# Patient Record
Sex: FEMALE | Race: WHITE | NOT HISPANIC OR LATINO | Employment: FULL TIME
[De-identification: names, ages, dates, MRNs, and addresses within clinical notes are randomized per-mention and may not be internally consistent; named-entity substitution may affect disease eponyms.]

---

## 2017-07-08 ENCOUNTER — HEALTH MAINTENANCE LETTER (OUTPATIENT)
Age: 42
End: 2017-07-08

## 2018-01-15 ENCOUNTER — COMMUNICATION - HEALTHEAST (OUTPATIENT)
Dept: FAMILY MEDICINE | Facility: CLINIC | Age: 43
End: 2018-01-15

## 2018-01-15 ENCOUNTER — OFFICE VISIT - HEALTHEAST (OUTPATIENT)
Dept: FAMILY MEDICINE | Facility: CLINIC | Age: 43
End: 2018-01-15

## 2018-01-15 DIAGNOSIS — F41.9 ANXIETY AND DEPRESSION: ICD-10-CM

## 2018-01-15 DIAGNOSIS — Z76.89 ENCOUNTER TO ESTABLISH CARE: ICD-10-CM

## 2018-01-15 DIAGNOSIS — F32.A ANXIETY AND DEPRESSION: ICD-10-CM

## 2018-01-15 ASSESSMENT — MIFFLIN-ST. JEOR: SCORE: 1403.39

## 2018-01-18 ENCOUNTER — TELEPHONE (OUTPATIENT)
Dept: FAMILY MEDICINE | Facility: CLINIC | Age: 43
End: 2018-01-18

## 2018-05-18 ENCOUNTER — OFFICE VISIT - HEALTHEAST (OUTPATIENT)
Dept: FAMILY MEDICINE | Facility: CLINIC | Age: 43
End: 2018-05-18

## 2018-05-18 DIAGNOSIS — Z00.00 ROUTINE HEALTH MAINTENANCE: ICD-10-CM

## 2018-05-18 DIAGNOSIS — Z13.220 SCREENING FOR LIPID DISORDERS: ICD-10-CM

## 2018-05-18 DIAGNOSIS — Z12.4 SCREENING FOR MALIGNANT NEOPLASM OF CERVIX: ICD-10-CM

## 2018-05-18 DIAGNOSIS — Z13.1 SCREENING FOR DIABETES MELLITUS: ICD-10-CM

## 2018-05-18 DIAGNOSIS — N93.9 ABNORMAL VAGINAL BLEEDING: ICD-10-CM

## 2018-05-18 DIAGNOSIS — F32.A ANXIETY AND DEPRESSION: ICD-10-CM

## 2018-05-18 DIAGNOSIS — M54.50 LEFT LOW BACK PAIN: ICD-10-CM

## 2018-05-18 DIAGNOSIS — F41.9 ANXIETY AND DEPRESSION: ICD-10-CM

## 2018-05-18 DIAGNOSIS — R31.9 HEMATURIA: ICD-10-CM

## 2018-05-18 DIAGNOSIS — N88.9 CERVIX ABNORMALITY: ICD-10-CM

## 2018-05-18 LAB
ALBUMIN UR-MCNC: ABNORMAL MG/DL
ANION GAP SERPL CALCULATED.3IONS-SCNC: 9 MMOL/L (ref 5–18)
APPEARANCE UR: CLEAR
BILIRUB UR QL STRIP: NEGATIVE
BUN SERPL-MCNC: 11 MG/DL (ref 8–22)
CALCIUM SERPL-MCNC: 10.1 MG/DL (ref 8.5–10.5)
CHLORIDE BLD-SCNC: 110 MMOL/L (ref 98–107)
CHOLEST SERPL-MCNC: 152 MG/DL
CO2 SERPL-SCNC: 18 MMOL/L (ref 22–31)
COLOR UR AUTO: YELLOW
CREAT SERPL-MCNC: 0.69 MG/DL (ref 0.6–1.1)
FASTING STATUS PATIENT QL REPORTED: YES
GFR SERPL CREATININE-BSD FRML MDRD: >60 ML/MIN/1.73M2
GLUCOSE BLD-MCNC: 86 MG/DL (ref 70–125)
GLUCOSE UR STRIP-MCNC: NEGATIVE MG/DL
HDLC SERPL-MCNC: 36 MG/DL
HGB UR QL STRIP: ABNORMAL
KETONES UR STRIP-MCNC: NEGATIVE MG/DL
LDLC SERPL CALC-MCNC: 96 MG/DL
LEUKOCYTE ESTERASE UR QL STRIP: ABNORMAL
NITRATE UR QL: NEGATIVE
PH UR STRIP: 6 [PH] (ref 5–8)
POTASSIUM BLD-SCNC: 4.4 MMOL/L (ref 3.5–5)
SODIUM SERPL-SCNC: 137 MMOL/L (ref 136–145)
SP GR UR STRIP: >=1.03 (ref 1–1.03)
TRIGL SERPL-MCNC: 102 MG/DL
UROBILINOGEN UR STRIP-ACNC: ABNORMAL

## 2018-05-18 ASSESSMENT — MIFFLIN-ST. JEOR: SCORE: 1393.75

## 2018-05-19 LAB — BACTERIA SPEC CULT: NO GROWTH

## 2018-05-21 ENCOUNTER — COMMUNICATION - HEALTHEAST (OUTPATIENT)
Dept: FAMILY MEDICINE | Facility: CLINIC | Age: 43
End: 2018-05-21

## 2018-05-21 LAB
HPV SOURCE: NORMAL
HUMAN PAPILLOMA VIRUS 16 DNA: NEGATIVE
HUMAN PAPILLOMA VIRUS 18 DNA: NEGATIVE
HUMAN PAPILLOMA VIRUS FINAL DIAGNOSIS: NORMAL
HUMAN PAPILLOMA VIRUS OTHER HR: NEGATIVE
SPECIMEN DESCRIPTION: NORMAL

## 2018-05-25 LAB
BKR LAB AP ABNORMAL BLEEDING: YES
BKR LAB AP BIRTH CONTROL/HORMONES: NORMAL
BKR LAB AP CERVICAL APPEARANCE: NORMAL
BKR LAB AP GYN ADEQUACY: NORMAL
BKR LAB AP GYN INTERPRETATION: NORMAL
BKR LAB AP HPV REFLEX: NORMAL
BKR LAB AP LMP: NORMAL
BKR LAB AP PATIENT STATUS: NORMAL
BKR LAB AP PREVIOUS ABNORMAL: NORMAL
BKR LAB AP PREVIOUS NORMAL: 2016
HIGH RISK?: NO
PATH REPORT.COMMENTS IMP SPEC: NORMAL
RESULT FLAG (HE HISTORICAL CONVERSION): NORMAL

## 2018-05-31 ENCOUNTER — RECORDS - HEALTHEAST (OUTPATIENT)
Dept: ADMINISTRATIVE | Facility: OTHER | Age: 43
End: 2018-05-31

## 2018-06-08 ENCOUNTER — COMMUNICATION - HEALTHEAST (OUTPATIENT)
Dept: FAMILY MEDICINE | Facility: CLINIC | Age: 43
End: 2018-06-08

## 2018-06-22 ENCOUNTER — OFFICE VISIT - HEALTHEAST (OUTPATIENT)
Dept: FAMILY MEDICINE | Facility: CLINIC | Age: 43
End: 2018-06-22

## 2018-06-22 DIAGNOSIS — D50.0 IRON DEFICIENCY ANEMIA DUE TO CHRONIC BLOOD LOSS: ICD-10-CM

## 2018-06-22 DIAGNOSIS — D25.9 UTERINE FIBROID: ICD-10-CM

## 2018-06-22 DIAGNOSIS — Z01.818 ENCOUNTER FOR PREOPERATIVE EXAMINATION FOR GENERAL SURGICAL PROCEDURE: ICD-10-CM

## 2018-06-22 LAB
ERYTHROCYTE [DISTWIDTH] IN BLOOD BY AUTOMATED COUNT: 17.6 % (ref 11–14.5)
HCG UR QL: NEGATIVE
HCT VFR BLD AUTO: 26.6 % (ref 35–47)
HGB BLD-MCNC: 7.9 G/DL (ref 12–16)
MCH RBC QN AUTO: 19.3 PG (ref 27–34)
MCHC RBC AUTO-ENTMCNC: 29.7 G/DL (ref 32–36)
MCV RBC AUTO: 65 FL (ref 80–100)
PLATELET # BLD AUTO: 387 THOU/UL (ref 140–440)
PMV BLD AUTO: 8.2 FL (ref 7–10)
RBC # BLD AUTO: 4.09 MILL/UL (ref 3.8–5.4)
SP GR UR STRIP: 1.03 (ref 1–1.03)
WBC: 8.2 THOU/UL (ref 4–11)

## 2018-06-22 ASSESSMENT — MIFFLIN-ST. JEOR: SCORE: 1388.31

## 2018-06-28 ENCOUNTER — ANESTHESIA - HEALTHEAST (OUTPATIENT)
Dept: SURGERY | Facility: HOSPITAL | Age: 43
End: 2018-06-28

## 2018-06-28 ASSESSMENT — MIFFLIN-ST. JEOR: SCORE: 1386.94

## 2018-06-29 ENCOUNTER — SURGERY - HEALTHEAST (OUTPATIENT)
Dept: SURGERY | Facility: HOSPITAL | Age: 43
End: 2018-06-29

## 2018-08-30 ENCOUNTER — COMMUNICATION - HEALTHEAST (OUTPATIENT)
Dept: FAMILY MEDICINE | Facility: CLINIC | Age: 43
End: 2018-08-30

## 2018-12-11 ENCOUNTER — OFFICE VISIT - HEALTHEAST (OUTPATIENT)
Dept: FAMILY MEDICINE | Facility: CLINIC | Age: 43
End: 2018-12-11

## 2018-12-11 DIAGNOSIS — J22 LOWER RESPIRATORY TRACT INFECTION: ICD-10-CM

## 2019-06-03 ENCOUNTER — COMMUNICATION - HEALTHEAST (OUTPATIENT)
Dept: SCHEDULING | Facility: CLINIC | Age: 44
End: 2019-06-03

## 2019-08-08 ENCOUNTER — COMMUNICATION - HEALTHEAST (OUTPATIENT)
Dept: SCHEDULING | Facility: CLINIC | Age: 44
End: 2019-08-08

## 2019-08-14 ENCOUNTER — COMMUNICATION - HEALTHEAST (OUTPATIENT)
Dept: FAMILY MEDICINE | Facility: CLINIC | Age: 44
End: 2019-08-14

## 2019-08-15 ENCOUNTER — OFFICE VISIT - HEALTHEAST (OUTPATIENT)
Dept: FAMILY MEDICINE | Facility: CLINIC | Age: 44
End: 2019-08-15

## 2019-08-15 DIAGNOSIS — F41.9 ANXIETY AND DEPRESSION: ICD-10-CM

## 2019-08-15 DIAGNOSIS — M54.2 NECK PAIN ON LEFT SIDE: ICD-10-CM

## 2019-08-15 DIAGNOSIS — H81.10 BENIGN PAROXYSMAL POSITIONAL VERTIGO, UNSPECIFIED LATERALITY: ICD-10-CM

## 2019-08-15 DIAGNOSIS — F32.A ANXIETY AND DEPRESSION: ICD-10-CM

## 2020-04-30 ENCOUNTER — COMMUNICATION - HEALTHEAST (OUTPATIENT)
Dept: FAMILY MEDICINE | Facility: CLINIC | Age: 45
End: 2020-04-30

## 2020-04-30 DIAGNOSIS — F32.A ANXIETY AND DEPRESSION: ICD-10-CM

## 2020-04-30 DIAGNOSIS — F41.9 ANXIETY AND DEPRESSION: ICD-10-CM

## 2020-05-21 ENCOUNTER — OFFICE VISIT - HEALTHEAST (OUTPATIENT)
Dept: FAMILY MEDICINE | Facility: CLINIC | Age: 45
End: 2020-05-21

## 2020-05-21 ENCOUNTER — COMMUNICATION - HEALTHEAST (OUTPATIENT)
Dept: SCHEDULING | Facility: CLINIC | Age: 45
End: 2020-05-21

## 2020-05-21 DIAGNOSIS — K21.00 GASTROESOPHAGEAL REFLUX DISEASE WITH ESOPHAGITIS: ICD-10-CM

## 2020-07-24 ENCOUNTER — COMMUNICATION - HEALTHEAST (OUTPATIENT)
Dept: FAMILY MEDICINE | Facility: CLINIC | Age: 45
End: 2020-07-24

## 2020-07-24 DIAGNOSIS — F32.A ANXIETY AND DEPRESSION: ICD-10-CM

## 2020-07-24 DIAGNOSIS — F41.9 ANXIETY AND DEPRESSION: ICD-10-CM

## 2020-08-17 ENCOUNTER — COMMUNICATION - HEALTHEAST (OUTPATIENT)
Dept: FAMILY MEDICINE | Facility: CLINIC | Age: 45
End: 2020-08-17

## 2020-08-17 DIAGNOSIS — K21.00 GASTROESOPHAGEAL REFLUX DISEASE WITH ESOPHAGITIS: ICD-10-CM

## 2020-10-21 ENCOUNTER — OFFICE VISIT - HEALTHEAST (OUTPATIENT)
Dept: FAMILY MEDICINE | Facility: CLINIC | Age: 45
End: 2020-10-21

## 2020-10-21 DIAGNOSIS — R10.84 ABDOMINAL PAIN, GENERALIZED: ICD-10-CM

## 2020-10-21 ASSESSMENT — MIFFLIN-ST. JEOR: SCORE: 1434.86

## 2020-11-02 ENCOUNTER — HOSPITAL ENCOUNTER (OUTPATIENT)
Dept: CT IMAGING | Facility: CLINIC | Age: 45
Discharge: HOME | End: 2020-11-02

## 2020-11-02 DIAGNOSIS — R10.84 ABDOMINAL PAIN, GENERALIZED: ICD-10-CM

## 2020-12-27 ENCOUNTER — COMMUNICATION - HEALTHEAST (OUTPATIENT)
Dept: SCHEDULING | Facility: CLINIC | Age: 45
End: 2020-12-27

## 2021-05-29 NOTE — TELEPHONE ENCOUNTER
"  Call from pt       \"Pretty sure I have a kidney stone\"     > Just started in the last few hrs - pain is a 7 currently     > Initially thought may have been UTI while at work but too painful and came home   > Overall feels similar to previous episode    > R sided lower back pain that radiates to groin area     No vomiting yet   No blood in urine but little output so far      A/P:   > To ED for eval now        Noel Smyth, RN   Triage and Medication Refills      Reason for Disposition    Pain radiates into groin, scrotum    Protocols used: FLANK PAIN-A-OH      "

## 2021-05-31 VITALS — WEIGHT: 184.5 LBS | HEIGHT: 60 IN | BODY MASS INDEX: 36.22 KG/M2

## 2021-05-31 NOTE — TELEPHONE ENCOUNTER
"Triage call:   Patient calling to report intermittent episodes of dizziness that have been going on for a couple months.  Had one bad episode (first episode) woke up and could not get out of bed due to dizziness- fell and couldn't walk straight- fell in the kitchen. Shook off and thought it was maybe vertigo.     Reports that she noted that the dizziness hasn't quite gone away when lays on her left side she feels very dizzy and feels like she could throw up. When she has to hold her neck back she gets very dizzy.   Currently she \"feels ok\" is able to work and drive- denies symptoms currently. But she has intermittent episodes. No HR changes. Patient reports that she just got insurance back and was finally able to schedule otherwise would have come in sooner. States that she has not been diagnosed with vertigo.      Triaged to be seen within the next 2 weeks. Reviewed additional care advice with patient and she verbalizes understanding. Patient warm transferred to scheduling for appointment. Appointment scheduled with Michelle @ 10:40 am 8/15/19.    Jane Balderas RN [facility] Connection Triage/Med Refill 8/8/2019 11:38 AM     Reason for Disposition    Dizziness not present now, but is a chronic symptom (recurrent or ongoing AND lasting > 4 weeks)    Protocols used: DIZZINESS-A-OH      "

## 2021-05-31 NOTE — PROGRESS NOTES
Assessment/Plan:     1. Benign paroxysmal positional vertigo, unspecified laterality  Appears to be having linger symptoms from an acute episode of BPPV.  Refer to OT for vestibular therapy.  Recommend good hydration and slow positional changes.  Declines Meclizine at this time.  - Ambulatory referral to Adult PT- Internal  - Ambulatory referral to Adult OT- Internal    2. Anxiety and depression  Restart Lexapro for anxiety and depression.  Discussed proper use and possible side effects of medication.  Follow up in 4 weeks.   - escitalopram oxalate (LEXAPRO) 10 MG tablet; Take 1 tablet (10 mg total) by mouth daily.  Dispense: 60 tablet; Refill: 0    3. Neck pain on left side  Suspect muscular strain.  Could be contributing to vertigo symptoms.  Recommend active rest, gentle stretching, ice/heat, and Ibuprofen as needed.  Refer to PT.   - Ambulatory referral to Adult PT- Internal        Subjective:     Sirisha Palmer is a 43 y.o. female who presents with complaints of dizziness.  Symptoms started acutely a couple of months ago.  She woke up feeling severely dizzy.  States the room was spinning around her.  In order to get out of bed, she had to crawl on the floor.  She did fall at one point, but she was already close to the floor.  She denies any headache, nausea, or vomiting with this episode.  The severity eventually subsided.  She did not have insurance, which is why she did not come in.  Patient still has intermittent dizziness.  This is worse when she moves her head quickly or lays on her side.  She is having some left sided neck pain and stiffness.  She has had some mild headaches and mild vision blurriness.  She is sleeping well at night.  No nausea, vomiting, or abdominal pain.  She is taking magnesium at night for sleep.  Denies any fever, sinus congestion, runny nose, or sore throat.    Patient has been out of insurance for the past year.  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  She was  put on Lexapro last year for anxiety and depression, but did not continue this secondary to loss of insurance.  Her mood is feeling more stable, but she does feel like she would benefit a medication again.  Denies any panic attacks or suicidal ideations.  She stopped Lexapro about 1 year ago.      The following portions of the patient's history were reviewed and updated as appropriate: allergies, current medications.    Review of Systems  A comprehensive review of systems was performed and was otherwise negative    Objective:     /72   Pulse 74   Temp 98.7  F (37.1  C)   Wt 176 lb 12.8 oz (80.2 kg)   LMP 06/21/2018   SpO2 97%   BMI 34.53 kg/m      General Appearance: Alert, cooperative, no distress, appears stated age  Head: Normocephalic, without obvious abnormality, atraumatic  Eyes: PERRL, conjunctiva/corneas clear, EOM's intact. No nystagmus.   Ears: Normal TM's and external ear canals, both ears  Throat: Lips, mucosa, and tongue normal; teeth and gums normal  Neck: Supple, symmetrical, trachea midline, no adenopathy;  thyroid: not enlarged, symmetric, no tenderness/mass/nodules  Lungs: Clear to auscultation bilaterally, respirations unlabored  Heart: Regular rate and rhythm, S1 and S2 normal, no murmur, rub, or gallop   Abdomen: Soft, non-tender, bowel sounds active all four quadrants,  no masses, no organomegaly  Neurologic: A&Ox4. Gait normal. No focal deficits. Upper and lower extremity strength equal bilaterally.   Psychologic: appropriate affective, answers all of my questions appropriately. No hallucinations, delusion, or suicidal ideations.    PHQ-9 Total Score: 15 (8/15/2019 11:00 AM)    EDILMA 7 Total Score: 19 (8/15/2019 11:00 AM)        Michelle Santana NP

## 2021-06-01 VITALS — WEIGHT: 181.5 LBS | BODY MASS INDEX: 35.63 KG/M2 | HEIGHT: 60 IN

## 2021-06-01 VITALS — BODY MASS INDEX: 35.34 KG/M2 | HEIGHT: 60 IN | WEIGHT: 180 LBS

## 2021-06-01 VITALS — WEIGHT: 180.3 LBS | BODY MASS INDEX: 35.4 KG/M2 | HEIGHT: 60 IN

## 2021-06-02 VITALS — BODY MASS INDEX: 35.35 KG/M2 | WEIGHT: 182.5 LBS

## 2021-06-03 VITALS — BODY MASS INDEX: 34.53 KG/M2 | WEIGHT: 176.8 LBS

## 2021-06-04 VITALS
WEIGHT: 190.56 LBS | BODY MASS INDEX: 37.22 KG/M2 | OXYGEN SATURATION: 99 % | HEART RATE: 70 BPM | DIASTOLIC BLOOD PRESSURE: 84 MMHG | SYSTOLIC BLOOD PRESSURE: 122 MMHG

## 2021-06-05 VITALS
OXYGEN SATURATION: 96 % | DIASTOLIC BLOOD PRESSURE: 88 MMHG | HEART RATE: 72 BPM | WEIGHT: 190.56 LBS | BODY MASS INDEX: 37.41 KG/M2 | SYSTOLIC BLOOD PRESSURE: 139 MMHG | HEIGHT: 60 IN

## 2021-06-07 NOTE — TELEPHONE ENCOUNTER
Refill Approved    Rx renewed per Medication Renewal Policy. Medication was last renewed on 8/15/19.    Monica Guzman, [facility] Triage/Med Refill 4/30/2020     Requested Prescriptions   Pending Prescriptions Disp Refills     escitalopram oxalate (LEXAPRO) 10 MG tablet 60 tablet 0     Sig: Take 1 tablet (10 mg total) by mouth daily.       SSRI Refill Protocol  Passed - 4/30/2020 10:17 AM        Passed - PCP or prescribing provider visit in last year     Last office visit with prescriber/PCP: 8/15/2019 Michelle Santana NP OR same dept: 8/15/2019 Michelle Santana NP OR same specialty: 8/15/2019 Michelle Santana NP  Last physical: 6/22/2018 Last MTM visit: Visit date not found   Next visit within 3 mo: Visit date not found  Next physical within 3 mo: Visit date not found  Prescriber OR PCP: Michelle Santana NP  Last diagnosis associated with med order: 1. Anxiety and depression  - escitalopram oxalate (LEXAPRO) 10 MG tablet; Take 1 tablet (10 mg total) by mouth daily.  Dispense: 60 tablet; Refill: 0    If protocol passes may refill for 12 months if within 3 months of last provider visit (or a total of 15 months).

## 2021-06-08 NOTE — PROGRESS NOTES
ASSESSMENT/PLAN:  Sirisha was seen today for cough.    Diagnoses and all orders for this visit:    Gastroesophageal reflux disease with esophagitis  -     omeprazole (PRILOSEC) 20 MG capsule; Take 1 capsule (20 mg total) by mouth daily before breakfast.        There are no Patient Instructions on file for this visit.    SUBJECTIVE:    Sirisha Palmer is a 44 y.o. female who came in today     1 month cough after eating, regardless of the type of food  Not to liquid or saliva  Cough after eating solid  No known allergies or asthma  2 wks ago, coughing so hard & gagging  Chronic burping but more excessive now  H/o heartburn  Ice cream, dairy, and gluten tends to exacerbate symptoms  Dysphagia (occasion) with solid but not to liquid  No odynphagia  No N/V, abdominal pain but with discomfort after eating large meals   Weight gain since COVID19  H/o ulcer, multiple times  S/p cholecystecomy  Exertional dyspnea more than usual  Lately (house cleaning job)  No chest pain  Bowel movements more often   Not a smoker  Takes lexapro for anxiety and prn aleve for HA    Review of Systems (except those mentioned above)  Constitutional: Negative.   HENT: Negative.   Eyes: Negative.   Respiratory: Negative.   Cardiovascular: Negative.   Gastrointestinal: Negative.   Endocrine: Negative.   Genitourinary: Negative.   Musculoskeletal: Negative.   Skin: Negative.   Allergic/Immunologic: Negative.   Neurological: Negative.   Hematological: Negative.   Psychiatric/Behavioral: Negative.     Patient Active Problem List    Diagnosis Date Noted     Mixed emotional features as adjustment reaction 06/28/2016     Calculus of ureter 11/12/2015     Urinary calculus, unspecified 11/12/2015     Allergies   Allergen Reactions     Codeine Nausea And Vomiting     Can tolerate if takes Zofran beforehand     Current Outpatient Medications   Medication Sig Dispense Refill     escitalopram oxalate (LEXAPRO) 10 MG tablet Take 1 tablet (10 mg total) by  mouth daily. 90 tablet 0     naproxen sodium (ALEVE) 220 MG tablet Take 220 mg by mouth 2 (two) times a day with meals. As needed for cramps       albuterol (PROAIR HFA;PROVENTIL HFA;VENTOLIN HFA) 90 mcg/actuation inhaler Inhale 2 puffs every 6 (six) hours as needed for wheezing. 1 each 0     omeprazole (PRILOSEC) 20 MG capsule Take 1 capsule (20 mg total) by mouth daily before breakfast. 90 capsule 0     No current facility-administered medications for this visit.      Past Medical History:   Diagnosis Date     Anemia      Kidney stones      Kidney stones      Menorrhagia      Past Surgical History:   Procedure Laterality Date     CHOLECYSTECTOMY       LAPAROSCOPIC TOTAL HYSTERECTOMY N/A 6/29/2018    Procedure: ROBOTIC TOTAL LAPAROSCOPIC HYSTERECTOMY BILATERAL SALPINGECTOMY CYSTOSCOPY;  Surgeon: Sandy Gabriel MD;  Location: [facility];  Service:      FL ERCP W/BIOPSY SINGLE/MULTIPLE       wisdom teeth extractions       Social History     Socioeconomic History     Marital status:      Spouse name: None     Number of children: None     Years of education: None     Highest education level: None   Occupational History     Occupation: Marketing     Employer: [employer]   Social Needs     Financial resource strain: None     Food insecurity     Worry: None     Inability: None     Transportation needs     Medical: None     Non-medical: None   Tobacco Use     Smoking status: Never Smoker     Smokeless tobacco: Never Used   Substance and Sexual Activity     Alcohol use: Yes     Comment: occasional glass of wine     Drug use: No     Sexual activity: Yes     Partners: Male     Birth control/protection: None   Lifestyle     Physical activity     Days per week: None     Minutes per session: None     Stress: None   Relationships     Social connections     Talks on phone: None     Gets together: None     Attends Rastafari service: None     Active member of club or organization: None     Attends  meetings of clubs or organizations: None     Relationship status: None     Intimate partner violence     Fear of current or ex partner: None     Emotionally abused: None     Physically abused: None     Forced sexual activity: None   Other Topics Concern     None   Social History Narrative     None     Family History   Problem Relation Age of Onset     Urolithiasis Mother      Heart disease Father      Stroke Father      Urolithiasis Sister      Cancer Maternal Aunt      Cancer Paternal Uncle      Diabetes Maternal Grandmother      Cancer Maternal Grandmother      Cancer Maternal Grandfather      Cancer Paternal Grandfather      Clotting disorder Neg Hx      Gout Neg Hx          OBJECTIVE:    Vitals:    05/21/20 1339   BP: 122/84   Patient Site: Right Arm   Patient Position: Sitting   Cuff Size: Adult Regular   Pulse: 70   SpO2: 99%   Weight: 190 lb 9 oz (86.4 kg)     Body mass index is 37.22 kg/m .    Physical Exam:  Constitutional: Patient was oriented to person, place, and time. Patient appeared well-developed and well-nourished. No distress.   Head: Normocephalic and atraumatic.   Right Ear: External ear normal.   Left Ear: External ear normal.   Nose: Nose normal.   Mouth/Throat: Oropharynx was clear and moist. No oropharyngeal exudate.   Eyes: Conjunctivae and EOM were normal. Pupils were equal, round, and reactive to light. Right eye exhibited no discharge. Left eye exhibited no discharge. No scleral icterus.   Neck: Neck supple. No JVD present. No tracheal deviation present. No thyromegaly present.   Lymphadenopathy:  Patient has no cervical adenopathy.   Cardiovascular: Normal rate, regular rhythm, normal heart sounds and intact distal pulses. No murmur heard.   Pulmonary/Chest: Effort normal and breath sounds normal. No stridor. No respiratory distress. Patient had no wheezes, no rales, exhibits no tenderness.   Abdominal: Soft. Bowel sounds were normal. Patient exhibited no distension and no mass. There  was no tenderness. There was no rebound and no guarding.   Skin: Skin was warm and dry. No rash noted. Patient was not diaphoretic. No erythema. No pallor.

## 2021-06-10 NOTE — TELEPHONE ENCOUNTER
Refill Approved    Rx renewed per Medication Renewal Policy. Medication was last renewed on 4/30/20.    Radha Stephenson, [facility] Triage/Med Refill 7/27/2020     Requested Prescriptions   Pending Prescriptions Disp Refills     escitalopram oxalate (LEXAPRO) 10 MG tablet [Pharmacy Med Name: ESCITALOPRAM 10MG TABLETS] 90 tablet 0     Sig: TAKE 1 TABLET(10 MG) BY MOUTH DAILY       SSRI Refill Protocol  Passed - 7/24/2020 12:34 PM        Passed - PCP or prescribing provider visit in last year     Last office visit with prescriber/PCP: 8/15/2019 Michelle Santana NP OR same dept: 5/21/2020 Josey Yoo MD OR same specialty: 5/21/2020 Josey Yoo MD  Last physical: 6/22/2018 Last MTM visit: Visit date not found   Next visit within 3 mo: Visit date not found  Next physical within 3 mo: Visit date not found  Prescriber OR PCP: Michelle Santana NP  Last diagnosis associated with med order: 1. Anxiety and depression  - escitalopram oxalate (LEXAPRO) 10 MG tablet [Pharmacy Med Name: ESCITALOPRAM 10MG TABLETS]; TAKE 1 TABLET(10 MG) BY MOUTH DAILY  Dispense: 90 tablet; Refill: 0    If protocol passes may refill for 12 months if within 3 months of last provider visit (or a total of 15 months).

## 2021-06-10 NOTE — TELEPHONE ENCOUNTER
Refill Approved    Rx renewed per Medication Renewal Policy. Medication was last renewed on 5/21/20.    Monica Guzman, [facility] Connection Triage/Med Refill 8/17/2020     Requested Prescriptions   Pending Prescriptions Disp Refills     omeprazole (PRILOSEC) 20 MG capsule [Pharmacy Med Name: OMEPRAZOLE 20MG CAPSULES] 90 capsule 0     Sig: TAKE 1 CAPSULE(20 MG) BY MOUTH DAILY BEFORE BREAKFAST       GI Medications Refill Protocol Passed - 8/17/2020  3:30 AM        Passed - PCP or prescribing provider visit in last 12 or next 3 months.     Last office visit with prescriber/PCP: 5/21/2020 Josey Yoo MD OR same dept: 5/21/2020 Josey Yoo MD OR same specialty: 5/21/2020 Josey Yoo MD  Last physical: Visit date not found Last MTM visit: Visit date not found   Next visit within 3 mo: Visit date not found  Next physical within 3 mo: Visit date not found  Prescriber OR PCP: Josey Fletcher MD  Last diagnosis associated with med order: 1. Gastroesophageal reflux disease with esophagitis  - omeprazole (PRILOSEC) 20 MG capsule [Pharmacy Med Name: OMEPRAZOLE 20MG CAPSULES]; TAKE 1 CAPSULE(20 MG) BY MOUTH DAILY BEFORE BREAKFAST  Dispense: 90 capsule; Refill: 0    If protocol passes may refill for 12 months if within 3 months of last provider visit (or a total of 15 months).

## 2021-06-12 NOTE — PROGRESS NOTES
"     Assessment:   1. Abdominal pain, generalized  Discussed possible diagnosis and treatment. Encouraged patient to take her prescribed PPI 7 days a week and not 3-4 days a week as she has been taking it. Also recommend ed a bland diet until she sees the gastroenterologist.   - Ambulatory referral to Gastroenterology  - CT Abdomen Without Oral With and Without IV Contrast; Future     Plan:   The diagnosis was discussed with the patient and evaluation and treatment plans outlined.  See orders for lab and imaging studies.  Reassured patient that symptoms are almost certainly benign and self-resolving.  Adhere to simple, bland diet.  Adhere to low fat diet.  Further follow-up plans will be based on outcome of lab/imaging studies; see orders.  Follow up as needed.     Subjective:   Sirisha Palmer is a 44 y.o. female who presents for evaluation of abdominal pain. Onset was 3 weeks ago. Symptoms have been gradually worsening. The pain is described as pinching pain that radiates around her stomach, and she has always had stomach issues but its getting worse. Pain is located in the epigastric region with radiation to the entire abdomen area.  Aggravating factors: food.  Alleviating factors: none. Associated symptoms: diarrhea. The patient denies anorexia, arthralagias, belching, chills, constipation, dysuria, fever, flatus and frequency.  The following portions of the patient's history were reviewed and updated as appropriate: allergies, current medications, past family history, past medical history, past social history, past surgical history and problem list.    Review of Systems  A 12 point comprehensive review of systems was negative except as noted.       Objective:   /88   Pulse 72   Ht 5' 0.25\" (1.53 m)   Wt 190 lb 9 oz (86.4 kg)   LMP 06/21/2018   SpO2 96%   BMI 36.91 kg/m    General appearance: alert, appears stated age and cooperative  Head: Normocephalic, without obvious abnormality, " atraumatic  Eyes: conjunctivae/corneas clear. PERRL, EOM's intact. Fundi benign.  Neck: no adenopathy, no carotid bruit, no JVD, supple, symmetrical, trachea midline and thyroid not enlarged, symmetric, no tenderness/mass/nodules  Heart: regular rate and rhythm, S1, S2 normal, no murmur, click, rub or gallop  Abdomen: abnormal findings:  hypoactive bowel sounds and tenderness with palpation of the epigastric area  Pulses: 2+ and symmetric  Skin: Skin color, texture, turgor normal. No rashes or lesions  Lymph nodes: Cervical, supraclavicular, and axillary nodes normal.  Neurologic: Grossly normal

## 2021-06-14 NOTE — TELEPHONE ENCOUNTER
"\"I think I have a Bartholin cyst, I have had it once before about a year ago. I was seen in the UC and they drained it. It began day before yesterday. I have been soaking in a hot tub and using hot compresses. It is significantly worse today. It has begun to drain on it's own: a little bit of blood and white milky like pus. Size; at first it was marble sized, but now larger. All around it is swollen, puffy and red.\" No fever noted. T=99.3. Pain currently =\"7\" if she is moving around or sitting. She states that it is hard to walk due to the pain. She had taken Tylenol 3 Extra Strength earlier today, but it did not help. (Cautioned regarding dose).   Triaged to a disposition of See HCP within 4 hrs. Discussed options: , UR or ER.  and UR [place] information given. She intends to go to [facility], as she had been there before with this problem.     Adelina Hernandez RN Triage Nurse Advisor 6:50 PM 12/27/2020    Additional Information    Negative: Sounds like a life-threatening emergency to the triager    Negative: Followed a genital area injury    Negative: Foreign body in vagina (e.g., tampon)    Negative: Vaginal bleeding is main symptom    Negative: Vaginal discharge is main symptom    Negative: Pain or burning with passing urine (urination) is main symptom    Negative: Menstrual cramps is main symptom    Negative: Abdomen pain is main symptom    Negative: Pubic lice suspected    Negative: Itching or rash of external female genital area (vulva)    Negative: Patient sounds very sick or weak to the triager    [1] SEVERE pain AND [2] not improved 2 hours after pain medicine    Protocols used: VAGINAL SYMPTOMS-A-AH    COVID 19 Nurse Triage Plan/Patient Instructions    Please be aware that novel coronavirus (COVID-19) may be circulating in the community. If you develop symptoms such as fever, cough, or SOB or if you have concerns about the presence of another infection including coronavirus (COVID-19), please " contact your health care provider or visit [email or web address].     Disposition/Instructions    In-Person Visit with provider recommended. Reference Visit Selection Guide.    Thank you for taking steps to prevent the spread of this virus.  o Limit your contact with others.  o Wear a simple mask to cover your cough.  o Wash your hands well and often.    Resources    [facility]: About COVID-19: [email or web address]    CDC: What to Do If You're Sick: www.cdc.gov/coronavirus/2019-ncov/about/steps-when-sick.html    CDC: Ending Home Isolation: www.cdc.gov/coronavirus/2019-ncov/hcp/disposition-in-home-patients.html     CDC: Caring for Someone: www.cdc.gov/coronavirus/2019-ncov/if-you-are-sick/care-for-someone.html     [facility]: Interim Guidance for Hospital Discharge to Home: [email or web address]/hcp/[email or web address]    [facility] clinical trials (COVID-19 research studies): [email or web address]-clinical-trials     Below are the COVID-19 hotlines at the [place] [facility] ([facility]). Interpreters are available.   o For health questions: Call 007-661-4104 or 1-100.539.9537 (7 a.m. to 7 p.m.)  o For questions about schools and childcare: Call 016-273-5438 or 1-234.173.3085 (7 a.m. to 7 p.m.)

## 2021-06-15 NOTE — PROGRESS NOTES
Assessment/Plan:     1. Encounter to establish care    2. Anxiety and depression  Significant anxiety and depression.  Recommend restarting patient on medication.  Start Lexapro, 10 mg once daily.  Referral placed for CBT, and I highly encouraged individual therapy.  Patient will contact myself, call 911, or pleural directly to the ER if she is having any suicidal ideations.  Discussed deep breathing in the times of anxiety.  Follow-up with myself in 3-4 weeks for a complete physical and medication follow-up.  EVELIN sent to previous clinic.  - Ambulatory referral to Psychology  - escitalopram oxalate (LEXAPRO) 10 MG tablet; Take 1 tablet (10 mg total) by mouth daily.  Dispense: 30 tablet; Refill: 1        Subjective:     Sirisha Palmer is a 42 y.o. female who presents to establish care.  She moved here approximately 3 years ago from [place].  The majority of her family and friends still reside there.  Patient moved to [place] to  her now .  This has not been an easy process for her, as she found out that her  has cheated on her several times including the day of their wedding.  Previously worked as a  for a pharmaceutical company, and did quite well for herself.  She lost her job due to increased stress, and now works as an  part-time.  Patient is still  but does not feel that her  is very supportive.  She is a 7-year-old stepson who is challenging.  He did go to couples therapy a couple of times.  Patient has a previous history of anxiety, depression, and PTSD.  She was on medications in the past, but prior to this last 3 years had been doing very well.  Patient has had increasingly more anxiety, anger, frustration, and sadness.  She does have suicidal thoughts, but no specific plan to follow through.  Patient is quite teary today and explains that she is having a hard time making decisions and remembering small things.  Her overall thoughts feel  fuzzy.  She has gained weight and at times scratches at her head obsessively.  Patient believes she was previously trialed on Celexa, Zoloft, and Lexapro.  She recalls one working better than the others, but does not quite remember.      The following portions of the patient's history were reviewed and updated as appropriate: allergies, current medications, past family history, past medical history, past social history, past surgical history and problem list.    Review of Systems  Pertinent items are noted in HPI.     Objective:     /82 (Patient Site: Right Arm, Patient Position: Sitting, Cuff Size: Adult Large)  Pulse 93  Temp 98.3  F (36.8  C) (Oral)   Resp 20  Ht 5' (1.524 m)  Wt 184 lb 8 oz (83.7 kg)  BMI 36.03 kg/m2    General Appearance: Alert, cooperative, appears stated age. Teary  Psychologic: appropriate affective, answers all of my questions appropriately. No hallucinations, delusion, or suicidal ideations.    PHQ-9 Total Score: 21 (1/15/2018  2:03 PM)  EDILMA-7 Total: 21 (1/15/2018  2:00 PM)      Time: total time spent with the patient was 30 minutes of which >50% was spent in counseling and coordination of care      MIKE Trejo

## 2021-06-18 NOTE — PROGRESS NOTES
Preoperative Exam    Scheduled Procedure: Total laparoscopic hysterectomy with bilateral salpingectomy  Surgery Date:  6/29/18  Surgery Location: [facility], fax [phone]    Surgeon:  Dr. Gabriel    Assessment/Plan:     1. Encounter for preoperative examination for general surgical procedure  - Pregnancy (Beta-hCG, Qual), Urine  - HM2(CBC w/o Differential)    2. Uterine fibroid    3. Iron deficiency anemia due to chronic blood loss  Hemoglobin is low at 7.9.  Likely secondary to heavy menstrual periods related to uterine fibroids.  Notified Dr. Gabriel, who agrees with oral iron supplementation prior to surgery.  Recommend recheck in 3 months.   - ferrous sulfate 325 (65 FE) MG tablet; Take 1 tablet (325 mg total) by mouth 3 (three) times a day with meals.  Dispense: 60 tablet; Refill: 3      Surgical Procedure Risk: Low (reported cardiac risk generally < 1%)  Have you had prior anesthesia?: yes  Have you or any family members had a previous anesthesia reaction:  no  Do you or any family members have a history of a clotting or bleeding disorder?:  No  Cardiac Risk Assessment: no increased risk for major cardiac complications    Patient approved for surgery with general or local anesthesia.    Please Note:  none    Functional Status: Independent  Patient plans to recover at home with family.     Subjective:      Sirisha Palmer is a 42 y.o. female who presents for a preoperative consultation.  Patient is scheduled for a robotic total laparoscopic hysterectomy with bilateral salpingectomy.  Patient has been having regular intermenstrual bleeding and postcoital bleeding.  He was referred to gynecology per myself, and found to have a cervical polyp and multiple uterine fibroids bulging into the uterine cavity.  Patient is currently bleeding.  She does have a moderate amount of abdominal cramping as well.  Patient is currently utilizing Aleve and Tylenol for her pain.  She is aware that she will need to  stop the Aleve starting today until after her surgery.  Patient is sexually active; not using any forms of contraception.    She is Lexapro for anxiety/depression.  Her mood is stable at this time.  She will be following up with me after surgery for this.     All other systems reviewed and are negative, other than those listed in the HPI.    Pertinent History  Do you have difficulty breathing or chest pain after walking up a flight of stairs: No  History of obstructive sleep apnea: No  Steroid use in the last 6 months: No  Frequent Aspirin/NSAID use: Yes: Aleve  Prior Blood Transfusion: No  Prior Blood Transfusion Reaction: No  Blood Transfusion Statement:  There is no transfusion refusal.    Current Outpatient Prescriptions   Medication Sig Dispense Refill     escitalopram oxalate (LEXAPRO) 10 MG tablet Take 1 tablet (10 mg total) by mouth daily. 30 tablet 1     naproxen sodium (ALEVE) 220 MG tablet Take 220 mg by mouth 2 (two) times a day with meals. As needed for cramps       No current facility-administered medications for this visit.         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[employer]     Social History Main Topics     Smoking status: Never Smoker     Smokeless tobacco: Never Used     Alcohol use Yes      Comment: occas     Drug use: No     Sexual activity: Yes     Partners: Male     Birth control/ protection: None     Other Topics Concern     Not on file  "    Social History Narrative       Family History   Problem Relation Age of Onset     Urolithiasis Mother      Heart disease Father      Stroke Father      Urolithiasis Sister      Cancer Maternal Aunt      Cancer Paternal Uncle      Diabetes Maternal Grandmother      Cancer Maternal Grandmother      Cancer Maternal Grandfather      Cancer Paternal Grandfather      Clotting disorder Neg Hx      Gout Neg Hx          Objective:     Vitals:    06/22/18 0904   BP: 110/64   Pulse: 78   Temp: 98.8  F (37.1  C)   TempSrc: Oral   Weight: 180 lb 4.8 oz (81.8 kg)   Height: 5' 0.25\" (1.53 m)         Physical Exam:  General Appearance: Alert, cooperative, no distress, appears stated age  Head: Normocephalic, without obvious abnormality, atraumatic  Eyes: PERRL, conjunctiva/corneas clear, EOM's intact  Ears: Normal TM's and external ear canals, both ears  Nose: Nares normal, septum midline,mucosa normal, no drainage  Throat: Lips, mucosa, and tongue normal; teeth and gums normal  Neck: Supple, symmetrical, trachea midline, no adenopathy;  thyroid: not enlarged, symmetric, no tenderness/mass/nodules; no carotid bruit or JVD  Back: Symmetric, no curvature, ROM normal, no CVA tenderness  Lungs: Clear to auscultation bilaterally, respirations unlabored  Heart: Regular rate and rhythm, S1 and S2 normal, no murmur, rub, or gallop   Abdomen: Soft, non-tender, bowel sounds active all four quadrants,  no masses, no organomegaly  Extremities: Extremities normal, atraumatic, no cyanosis or edema  Skin: Skin color, texture, turgor normal, no rashes or lesions  Lymph nodes: Cervical, supraclavicular, and axillary nodes normal  Neurologic: Normal     Patient Instructions     Hold all supplements, aspirin and NSAIDs for 7 days prior to surgery.  Follow your surgeon's direction on when to stop eating and drinking prior to surgery.  Your surgeon will be managing your pain after your surgery.    Remove all jewelry and metal piercings before your " surgery.   Remove nail polish from fingers before surgery.      Labs:  Recent Results (from the past 24 hour(s))   Pregnancy (Beta-hCG, Qual), Urine    Collection Time: 06/22/18  9:35 AM   Result Value Ref Range    Pregnancy Test, Urine Negative Negative    Specific Gravity, UA 1.030 1.001 - 1.030   HM2(CBC w/o Differential)    Collection Time: 06/22/18  9:35 AM   Result Value Ref Range    WBC 8.2 4.0 - 11.0 thou/uL    RBC 4.09 3.80 - 5.40 mill/uL    Hemoglobin 7.9 (LL) 12.0 - 16.0 g/dL    Hematocrit 26.6 (L) 35.0 - 47.0 %    MCV 65 (L) 80 - 100 fL    MCH 19.3 (L) 27.0 - 34.0 pg    MCHC 29.7 (L) 32.0 - 36.0 g/dL    RDW 17.6 (H) 11.0 - 14.5 %    Platelets 387 140 - 440 thou/uL    MPV 8.2 7.0 - 10.0 fL       Immunization History   Administered Date(s) Administered     Tdap 01/01/2012         Electronically signed by Michelle Santana NP

## 2021-06-18 NOTE — PROGRESS NOTES
Assessment/Plan:     1. Routine health maintenance    2. Hematuria  I do think what patient believes is blood in her urine, is actually vaginal bleeding.  Proceed with gynecology referral and workup due to abnormal cervical exam.  Urine culture pending.  - Urinalysis-UC if Indicated  - Culture, Urine    3. Screening for diabetes mellitus  - Basic Metabolic Panel    4. Screening for lipid disorders  - Lipid Leonard FASTING    5. Screening for malignant neoplasm of cervix  - Gynecologic Cytology (PAP Smear)    6. Anxiety and depression  PHQ 9 score of 19 and EDILMA 7 score of 21.  I do think patient would benefit from a medication, and she agrees.  Her biggest concern is finances.  I encouraged her to fill the Lexapro as previously prescribed, and she will let me know if this is too expensive for her.  I do think this will help manage her moods better and help her with concentration and anxiety.  - escitalopram oxalate (LEXAPRO) 10 MG tablet; Take 1 tablet (10 mg total) by mouth daily.  Dispense: 30 tablet; Refill: 1    7. Abnormal vaginal bleeding  - Ambulatory referral to Gynecology    8. Cervix abnormality  With abnormal vaginal bleeding, postcoital bleeding, and an abnormal appearing cervix.  Pap is pending.  I recommend follow-up with gynecology for better assessment, and possible biopsy.  Patient agrees with this.  - Ambulatory referral to Gynecology    9. Left low back pain  Recommend resuming Aleve twice daily with food.  Continue regular activity, and monitor ergo dynamics with lifting.  Discussed and demonstrated stretching that I would like her to do 1-2 times daily.  If not improving, consider physical therapy.    Subjective:   Sirisha Palmer  is a 42 y.o. female who presents for an annual exam.     Patient is due for a Pap.  She does not think she has ever had any abnormals.  She is having regular periods, but they have become heavier and more painful in the last several months.  She has been spotting  between periods.  Patient endorses that sexual intercourse is very painful, even with lubrication.  She does not necessarily think that the pain is due to dryness.  She will bleed large amount of bright red blood after intercourse.    Patient complains of blood in her urine.  No history of kidney stones.  No flank pain.  She is not quite sure if this is coming from the urine or the vagina.  Denies any dysuria or urinary frequency/urgency.  No abdominal pain.    Patient complains of left low back pain that radiates to the left knee.  This is new over the past month or so.  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  It hurts to stay in one position for long periods of time including sitting or standing.  She is most comfortable walking.  Denies any radiation of pain into the lower leg.  No paresthesias in the feet.  She has had some intermittent swelling in the left knee and clicking/cracking with deep knee bends.  Patient does not have a history of back or knee problems.  No treatments tried at home.    Patient was previously seen for anxiety and depression.  She was prescribed Lexapro, but never ended up filling this secondary to financial concerns.  Patient continues to have a significant amount of anxiety.  She recently started cleaning houses and doing laundry, which has helped some of her financial anxiety.  However, she has even less time for herself now.  Patient has difficulty concentrating and has racing anxious thoughts throughout the day.  The relationship with her  and stepson continue to be strained.  Patient is not seen a counselor, and is not open to this at this time.  She denies any suicidal ideations.      Healthy Habits:   Regular Exercise: no  Sunscreen Use: yes   Healthy Diet: no  Dental Visits Regularly: yes  Seat Belt: yes  Sexually active: yes  Self Breast Exam Monthly: yes  Hemoccults: na  Flex Sig: na  Colonoscopy: na  Lipid Profile: na  Glucose  Screen: na  Prevention of Osteoporosis: na  Last Dexa: na    Immunization History   Administered Date(s) Administered     Tdap 01/01/2012       Immunization status: up to date and documented.    Gynecologic History  LMP: 4/30/18  Contraception: none  Last Pap: unsure Results were: normal  Last mammogram: na Results were: na    OB History     No data available          Current Outpatient Prescriptions   Medication Sig Dispense Refill     naproxen sodium (ALEVE) 220 MG tablet Take 220 mg by mouth 2 (two) times a day with meals. As needed for cramps       escitalopram oxalate (LEXAPRO) 10 MG tablet Take 1 tablet (10 mg total) by mouth daily. 30 tablet 1     No current facility-administered medications for this visit.        Past Medical History:   Diagnosis Date     Kidney stones      Past Surgical History:   Procedure Laterality Date     CHOLECYSTECTOMY       [place] ERCP W/BIOPSY SINGLE/MULTIPLE          Allergies   Allergen Reactions     Codeine Nausea And Vomiting     Family History   Problem Relation Age of Onset     Urolithiasis Mother      Heart disease Father      Stroke Father      Urolithiasis Sister      Cancer Maternal Aunt      Cancer Paternal Uncle      Diabetes Maternal Grandmother      Cancer Maternal Grandmother      Cancer Maternal Grandfather      Cancer Paternal Grandfather      Clotting disorder Neg Hx      Gout Neg Hx      Social History     Social History     Marital status:      Spouse name: N/A     Number of children: N/A     Years of education: N/A     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[employer]     Social History Main Topics     Smoking status: Never Smoker     Smokeless tobacco: Never Used     Alcohol use Yes      Comment: occas     Drug use: No     Sexual activity: Yes     Partners: Male     Birth control/ protection: None     Other Topics Concern     Not on file     Social History Narrative        Review of Systems  Fourteen point review of systems negative except as  "mentioned in HPI    Objective:      Vitals:    05/18/18 0953   BP: 122/76   Patient Site: Right Arm   Patient Position: Sitting   Cuff Size: Adult Regular   Pulse: 84   Temp: 98.4  F (36.9  C)   TempSrc: Oral   Weight: 181 lb 8 oz (82.3 kg)   Height: 5' 0.25\" (1.53 m)     Body mass index is 35.15 kg/(m^2).    Physical Exam:  General Appearance: Alert, cooperative, no distress, appears stated age  Head: Normocephalic, without obvious abnormality, atraumatic  Eyes: PERRL, conjunctiva/corneas clear, EOM's intact  Ears: Normal TM's and external ear canals, both ears  Throat: Lips, mucosa, and tongue normal; teeth and gums normal. Normal pharynx  Neck: Supple, symmetrical, trachea midline, no adenopathy;  thyroid: not enlarged, symmetric, no tenderness/mass/nodules; no carotid bruit or JVD  Back: Symmetric, no curvature, ROM normal, no CVA tenderness  Lungs: Clear to auscultation bilaterally, respirations unlabored  Breasts: No breast masses, tenderness, asymmetry, or nipple discharge.  Heart: Regular rate and rhythm, S1 and S2 normal, no murmur, rub, or gallop  Abdomen: Soft, non-tender, bowel sounds active all four quadrants, no masses, no organomegaly  Pelvic:Perineum: is normal  Vulva: normal  Vagina: normal mucosa  Cervix: speculum exam painful for patient, therefore making exam difficult. There is an ~1cm lesion at the 2 o'clock location of the cervical os. The lesion has a hemangioma-like appearance, and easily bleed bright red blood.   Uterus: normal size  Extremities: Extremities normal, atraumatic, no cyanosis or edema  Skin: Skin color, texture, turgor normal, no rashes or lesions  Lymph nodes: Cervical, supraclavicular, and axillary nodes normal  Neurologic: Normal   Psychologic: appropriate affective, answers all of my questions appropriately. No hallucinations, delusion, or suicidal ideations.    PHQ-9 Total Score: 19 (5/18/2018 11:00 AM)  EDILMA-7 Total: 21 (1/15/2018  2:00 PM)      MIKE Trejo    "

## 2021-06-19 NOTE — ANESTHESIA PREPROCEDURE EVALUATION
Anesthesia Evaluation      Patient summary reviewed   No history of anesthetic complications     Airway   Mallampati: II  Neck ROM: full   Pulmonary - negative ROS and normal exam                          Cardiovascular - negative ROS and normal exam  Exercise tolerance: > or = 4 METS   Neuro/Psych    (+) depression, anxiety/panic attacks,     Endo/Other    (+) obesity (Obesity - BMI 35),      GI/Hepatic/Renal    (+)   chronic renal disease (Hx nephrolithiasis),      Other findings: Anemia secondary to menorrhagia from fibroids- Hgb 7.9 on 6/22, one ordered for today. Nephrolithiasis.  Panic attacks.  Addendum:  Hg today 7.7.      Dental - normal exam                        Anesthesia Plan  Planned anesthetic: general endotracheal  Type and crossmatch 1 unit PRBC  Scopolamine patch  Decadron/Versed    ASA 3   Induction: intravenous   Anesthetic plan and risks discussed with: patient  Anesthesia plan special considerations: video-assisted, antiemetics,   Post-op plan: routine recovery

## 2021-06-19 NOTE — ANESTHESIA CARE TRANSFER NOTE
Last vitals:   Vitals:    06/29/18 1240   BP: 139/63   Pulse: (!) 112   Resp: 17   Temp:    SpO2: 99%     Pt brought to PACU on 10L facemask. Monitors applied. VSS upon arrival.    Patient's level of consciousness is drowsy  Spontaneous respirations: yes  Maintains airway independently: yes  Dentition unchanged: yes  Oropharynx: oropharynx clear of all foreign objects    QCDR Measures:  ASA# 20 - Surgical Safety Checklist: WHO surgical safety checklist completed prior to induction  PQRS# 430 - Adult PONV Prevention: 4558F - Pt received => 2 anti-emetic agents (different classes) preop & intraop  ASA# 8 - Peds PONV Prevention: NA - Not pediatric patient, not GA or 2 or more risk factors NOT present  PQRS# 424 - Lexi-op Temp Management: 4559F - At least one body temp DOCUMENTED => 35.5C or 95.9F within required timeframe  PQRS# 426 - PACU Transfer Protocol: - Transfer of care checklist used  ASA# 14 - Acute Post-op Pain: ASA14B - Patient did NOT experience pain >= 7 out of 10

## 2021-06-19 NOTE — LETTER
Letter by Michelle Santana NP at      Author: Michlele Sanatna NP Service: -- Author Type: --    Filed:  Encounter Date: 8/14/2019 Status: (Other)         Sirisha HILL Spencer  [address] [facility] [address]             August 14, 2019         Dear Ms. Palmer,    I just wanted to send a friendly reminder that you are due for your annual depression follow up.  These appointments need to be done yearly and make sure there are not interruptions with medication refills as well.  Please use my chart or call the clinic at [phone] and schedule an appointment with your provider.    Please call with questions or contact us using [employer].    Sincerely,        Electronically signed by Michelle Santana NP

## 2021-06-22 NOTE — PROGRESS NOTES
ASSESSMENT:   1. Lower respiratory tract infection  XR Chest 2 Views    predniSONE (DELTASONE) 20 MG tablet    benzonatate (TESSALON) 200 MG capsule    albuterol (PROAIR HFA;PROVENTIL HFA;VENTOLIN HFA) 90 mcg/actuation inhaler       PLAN:  43-year-old female presents for evaluation of ongoing cough.  Fevers have resolved.  Exam reveals some rhonchi and expiratory wheezes to the right lower lobe.  Remainder of physical exam is unremarkable.  Chest x-ray is obtained, no infiltrate or effusion is seen.  This is confirmed by radiology overread.  Nothing on history or exam to suggest pulmonary embolism, heart failure.  Likely bronchitis.  Patient is prescribed an albuterol inhaler as well as prednisone and Tessalon Perles for cough control.  A postdated prescription for azithromycin is provided if no improvement over the next several days, patient is instructed that she may fill at that time.  She will return to clinic with new or worsening symptoms, follow-up with primary care if no improvement after 1 week.    I discussed red flag symptoms, return precautions to clinic/ER and follow up care with patient/parent.  Expected clinical course, symptomatic cares advised. Questions answered. Patient/parent amenable with plan.    Patient Instructions:  Patient Instructions   There were no signs of pneumonia.    Your symptoms are most likely due to acute bronchitis.  This is inflammation of the tubes leading into the lungs, most often due to a viral infection and an antibiotic will not help this.    I have prescribed an albuterol inhaler to help with the cough/wheezing.  I have also prescribed prednisone to help with the wheezing, inflammation.    May take Tessalon Perles as needed for cough.  May also try to use Mucinex or Robitussin.    I have printed a prescription for an antibiotic.  If you have no improvement over the next 2-3 days, develop fevers you may fill this prescription.  If you do fill it, please finish all the  medication.      Please monitor symptoms carefully.  If developing chest pain, shortness of breath, fever, coughing up blood, extreme fatigue, or any other new, concerning symptoms, come back to clinic or go to ER immediately.  Otherwise, if no improvement in symptoms in one week, follow-up with your primary care provider.    Prednisone Discharge Instructions:  Please take the steroid, Prednisone, for the full course as prescribed.  Take Prednisone with food or milk to minimize stomach upset.      Side effects of Prednisone include difficulty sleeping, increased appetite, weight gain, and changes in mood.  If you are diabetic, please monitor your blood sugar regularly while taking this medicine as Prednisone can cause high blood sugar.          SUBJECTIVE:   Sirisha Palmer is a 43 y.o. female who presents today with 10 days of cough, congestion, bloody nose, post-tussive emesis, did have fevers about a week ago which have now resolved.  Cough is productive.  No hemoptysis.  No chest pain, no painful respiration.  No shortness of breath.  No recent travel.  No lower extremity swelling.  No calf pain.  No exogenous estrogen.  No recent procedures or surgeries.  Increased fatigue.  No flu shot this year.  Non smoker.  No known ill contacts.  Taking Nyquil without relief.      ROS:  Comprehensive 12 pt ROS completed, positives noted in HPI, otherwise negative.      Past Medical History:  Patient Active Problem List   Diagnosis     Calculus of ureter     Urinary calculus, unspecified     Mixed emotional features as adjustment reaction       Surgical History:  Past Surgical History:   Procedure Laterality Date     CHOLECYSTECTOMY       LAPAROSCOPIC TOTAL HYSTERECTOMY N/A 6/29/2018    Procedure: ROBOTIC TOTAL LAPAROSCOPIC HYSTERECTOMY BILATERAL SALPINGECTOMY CYSTOSCOPY;  Surgeon: Sandy Gabriel MD;  Location: [facility] [place];  Service:      [place] ERCP W/BIOPSY SINGLE/MULTIPLE       wisdom teeth extractions              Family History:  Family History   Problem Relation Age of Onset     Urolithiasis Mother      Heart disease Father      Stroke Father      Urolithiasis Sister      Cancer Maternal Aunt      Cancer Paternal Uncle      Diabetes Maternal Grandmother      Cancer Maternal Grandmother      Cancer Maternal Grandfather      Cancer Paternal Grandfather      Clotting disorder Neg Hx      Gout Neg Hx        Reviewed; Non-contributory    Social History     Tobacco Use   Smoking Status Never Smoker   Smokeless Tobacco Never Used       Current Medications:  Current Outpatient Medications on File Prior to Visit   Medication Sig Dispense Refill     escitalopram oxalate (LEXAPRO) 10 MG tablet Take 1 tablet (10 mg total) by mouth daily. (Patient taking differently: Take 10 mg by mouth every evening. ) 30 tablet 1     naproxen sodium (ALEVE) 220 MG tablet Take 220 mg by mouth 2 (two) times a day with meals. As needed for cramps       [DISCONTINUED] oxyCODONE (ROXICODONE) 5 MG immediate release tablet Take 1 tablet (5 mg total) by mouth every 4 (four) hours as needed. 30 tablet 0     No current facility-administered medications on file prior to visit.        Allergies:   Allergies   Allergen Reactions     Codeine Nausea And Vomiting     Can tolerate if takes Zofran beforehand       OBJECTIVE:   Vitals:    12/11/18 1315   BP: 124/80   Patient Site: Right Arm   Patient Position: Sitting   Cuff Size: Adult Large   Pulse: 93   Resp: 16   Temp: 98.2  F (36.8  C)   TempSrc: Oral   SpO2: 96%   Weight: 182 lb 8 oz (82.8 kg)     Physical exam reveals a pleasant 43 y.o. female.   Appears healthy, alert and cooperative. Non-toxic appearance.  Eyes:  No conjunctivitis, lids normal.   Ears:  Normal appearing auricle. External auditory meatus without excessive cerumen, edema or erythema. normal TMs bilaterally and normal canals bilaterally.  No mastoid tenderness. No pain with palpation over tragus.  Nose:    Mucosa normal. No rhinorrhea.  No sinus tenderness.  Mouth:  Mucosa pink and moist.  mild erythema. No trismus. No evidence of PTA. Normal phonation.  Neck: few small anterior cervical nodes  Lungs: Rhonchi and coarse expiratory wheezes noted to the right lower lobe, remainder of lung fields are clear.  Heart: regular rate and rhythm, no murmur, rub or gallop  Abdomen: soft, nontender. No masses or organomegaly  Skin: pink, warm, dry, and without lesions on limited skin exam. No rashes.       RADIOLOGY    Xr Chest 2 Views    Result Date: 12/11/2018  XR CHEST 2 VIEWS 12/11/2018 1:49 PM INDICATION: Cough, fever, rll rhonchi and wheezes COMPARISON: None. FINDINGS: Negative chest.      LABORATORY STUDIES    none      Nahed Diaz, CNP

## 2021-08-04 DIAGNOSIS — F32.A ANXIETY AND DEPRESSION: Primary | ICD-10-CM

## 2021-08-04 DIAGNOSIS — F41.9 ANXIETY AND DEPRESSION: Primary | ICD-10-CM

## 2021-08-08 NOTE — TELEPHONE ENCOUNTER
"Unable to fill as last prescriber is no longer here. Needs new script.    escitalopram oxalate (LEXAPRO) 10 MG [id number] - Route: Take 1 tablet (10 mg total) by mouth daily. - Oral  as: escitalopram 10 mg tablet (LEXAPRO)E-Prescribing Status: Receipt confirmed by pharmacy (7/27/2020  2:52 PM CDT)     Last Written Prescription Date:  07/27/2020  Last Fill Quantity: 90,  # refills: 3   Last office visit provider:  10/21/2020 with Adenike Bettencourt CNP.     Requested Prescriptions   Pending Prescriptions Disp Refills     escitalopram (LEXAPRO) 10 MG tablet [Pharmacy Med Name: ESCITALOPRAM 10 MG TABLET] 90 tablet 2     Sig: TAKE 1 TABLET BY MOUTH ONCE DAILY       SSRIs Protocol Failed - 8/4/2021 12:18 AM        Failed - Medication is active on med list        Passed - Recent (12 mo) or future (30 days) visit within the authorizing provider's specialty     Patient has had an office visit with the authorizing provider or a provider within the authorizing providers department within the previous 12 mos or has a future within next 30 days. See \"Patient Info\" tab in inbasket, or \"Choose Columns\" in Meds & Orders section of the refill encounter.              Passed - Patient is age 18 or older        Passed - No active pregnancy on record        Passed - No positive pregnancy test in last 12 months             Radha Holman 08/07/21 9:41 PM  "

## 2021-08-09 RX ORDER — ESCITALOPRAM OXALATE 10 MG/1
TABLET ORAL
Qty: 90 TABLET | Refills: 2 | Status: SHIPPED | OUTPATIENT
Start: 2021-08-09 | End: 2022-05-20

## 2021-09-12 ENCOUNTER — HEALTH MAINTENANCE LETTER (OUTPATIENT)
Age: 46
End: 2021-09-12

## 2022-02-11 ENCOUNTER — NURSE TRIAGE (OUTPATIENT)
Dept: NURSING | Facility: CLINIC | Age: 47
End: 2022-02-11

## 2022-02-11 ENCOUNTER — TRANSFERRED RECORDS (OUTPATIENT)
Dept: HEALTH INFORMATION MANAGEMENT | Facility: CLINIC | Age: 47
End: 2022-02-11

## 2022-02-11 NOTE — TELEPHONE ENCOUNTER
"Patient fell on the ice before Radha fell on shoulder and elbow on the right side.  A few weeks ago her shoulder and elbow started hurting more.  Now she is feeling tingling in her right hand.  Patient is concerned and would like it checked out.      Patient was able to get an appointment for Monday with her primary.  She is wanting to know what she could do tonight to help her with the pain.    Gave option of going to a orthopedic urgent care.  There are 2 close to the patient in [place].  Patient plans to go there tonight to have her shoulder and elbow evaluated.    Care advise given to be seen in the next 3 days.    Zaira Shaffer RN   02/11/22 5:09 PM  [facility] Nurse Advisor    Reason for Disposition    Followed a shoulder injury    [1] After 2 weeks AND [2] still painful    Additional Information    Negative: Passed out (i.e., lost consciousness, collapsed and was not responding)    Negative: Shock suspected (e.g., cold/pale/clammy skin, too weak to stand, low BP, rapid pulse)    Negative: [1] Similar pain previously AND [2] it was from \"heart attack\"    Negative: [1] Similar pain previously AND [2] it was from \"angina\" AND [3] not relieved by nitroglycerin    Negative: Sounds like a life-threatening emergency to the triager    Negative: Serious injury with multiple fractures (broken bones)    Negative: [1] Major bleeding (e.g., actively dripping or spurting) AND [2] can't be stopped    Negative: Bullet wound, stabbed by knife, or other serious penetrating wound    Negative: Sounds like a life-threatening emergency to the triager    Negative: Wound looks infected    Negative: Shoulder pain from overuse (work, exercise, gardening) OR from self-induced lifting injury    Negative: Shoulder pain not from an injury    Negative: Looks like a broken bone (crooked or deformed)    Negative: Looks like a dislocated joint    Negative: Can't move injured shoulder at all    Negative: Skin is split open or " gaping (or length > 1/2 inch or 12 mm)    Negative: [1] Bleeding AND [2] won't stop after 10 minutes of direct pressure (using correct technique)    Negative: [1] Dirt in the wound AND [2] not removed with 15 minutes of scrubbing    Negative: Sounds like a serious injury to the triager    Negative: [1] SEVERE pain AND [2] not improved 2 hours after pain medicine/ice packs    Negative: [1] Large swelling or bruise (> 2 inches or 5 cm)    AND [2] can't move injured shoulder normally (range of motion, touch top of head)    Negative: [1] Collarbone is painful AND [2] difficulty raising arm    Negative: Suspicious history for the injury    Negative: Can't move injured shoulder normally (e.g., full range of motion, able to touch top of head)    Negative: Large swelling or bruise (> 2 inches or 5 cm)    Negative: [1] High-risk adult (e.g., age > 60, osteoporosis, chronic steroid use) AND [2] still hurts    Negative: [1] No prior tetanus shots (or is not fully vaccinated) AND [2] any wound (e.g., cut, scrape)    Negative: [1] HIV positive or severe immunodeficiency (severely weak immune system) AND [2] DIRTY cut or scrape    Negative: [1] Last tetanus shot > 5 years ago AND [2] DIRTY cut or scrape    Negative: [1] Last tetanus shot > 10 years ago AND [2] CLEAN cut or scrape (e.g., object AND skin were clean)    Negative: [1] After 3 days AND [2] pain not improving    Protocols used: SHOULDER PAIN-A-AH, SHOULDER INJURY-A-AH

## 2022-05-17 DIAGNOSIS — F43.23 ADJUSTMENT DISORDER WITH MIXED ANXIETY AND DEPRESSED MOOD: ICD-10-CM

## 2022-05-17 RX ORDER — CITALOPRAM HYDROBROMIDE 20 MG/1
TABLET ORAL
Qty: 30 TABLET | Refills: 0 | OUTPATIENT
Start: 2022-05-17

## 2022-11-19 ENCOUNTER — HEALTH MAINTENANCE LETTER (OUTPATIENT)
Age: 47
End: 2022-11-19

## 2023-07-18 ENCOUNTER — OFFICE VISIT (OUTPATIENT)
Dept: FAMILY MEDICINE | Facility: CLINIC | Age: 48
End: 2023-07-18
Payer: COMMERCIAL

## 2023-07-18 ENCOUNTER — APPOINTMENT (OUTPATIENT)
Dept: ULTRASOUND IMAGING | Facility: CLINIC | Age: 48
End: 2023-07-18
Attending: EMERGENCY MEDICINE
Payer: COMMERCIAL

## 2023-07-18 ENCOUNTER — APPOINTMENT (OUTPATIENT)
Dept: CT IMAGING | Facility: CLINIC | Age: 48
End: 2023-07-18
Attending: STUDENT IN AN ORGANIZED HEALTH CARE EDUCATION/TRAINING PROGRAM
Payer: COMMERCIAL

## 2023-07-18 ENCOUNTER — HOSPITAL ENCOUNTER (EMERGENCY)
Facility: CLINIC | Age: 48
Discharge: HOME | End: 2023-07-18
Attending: STUDENT IN AN ORGANIZED HEALTH CARE EDUCATION/TRAINING PROGRAM | Admitting: STUDENT IN AN ORGANIZED HEALTH CARE EDUCATION/TRAINING PROGRAM
Payer: COMMERCIAL

## 2023-07-18 VITALS
OXYGEN SATURATION: 99 % | WEIGHT: 185 LBS | HEART RATE: 115 BPM | BODY MASS INDEX: 36.32 KG/M2 | TEMPERATURE: 98.1 F | HEIGHT: 60 IN | RESPIRATION RATE: 19 BRPM | DIASTOLIC BLOOD PRESSURE: 86 MMHG | SYSTOLIC BLOOD PRESSURE: 173 MMHG

## 2023-07-18 VITALS
DIASTOLIC BLOOD PRESSURE: 89 MMHG | RESPIRATION RATE: 18 BRPM | SYSTOLIC BLOOD PRESSURE: 149 MMHG | OXYGEN SATURATION: 99 % | HEART RATE: 85 BPM | TEMPERATURE: 97.9 F

## 2023-07-18 DIAGNOSIS — R09.89 HOMANS SIGN PRESENT: ICD-10-CM

## 2023-07-18 DIAGNOSIS — R60.0 BILATERAL LEG EDEMA: ICD-10-CM

## 2023-07-18 DIAGNOSIS — R06.02 SHORTNESS OF BREATH: ICD-10-CM

## 2023-07-18 DIAGNOSIS — M79.89 LOCALIZED SWELLING OF BOTH LOWER EXTREMITIES: Primary | ICD-10-CM

## 2023-07-18 LAB
ALBUMIN SERPL-MCNC: 3.9 G/DL (ref 3.5–5)
ALP SERPL-CCNC: 93 U/L (ref 45–120)
ALT SERPL W P-5'-P-CCNC: <9 U/L (ref 0–45)
ANION GAP SERPL CALCULATED.3IONS-SCNC: 5 MMOL/L (ref 5–18)
AST SERPL W P-5'-P-CCNC: 11 U/L (ref 0–40)
ATRIAL RATE - MUSE: 79 BPM
BASOPHILS # BLD AUTO: 0 10E3/UL (ref 0–0.2)
BASOPHILS NFR BLD AUTO: 0 %
BILIRUB SERPL-MCNC: 0.4 MG/DL (ref 0–1)
BNP SERPL-MCNC: 16 PG/ML (ref 0–67)
BUN SERPL-MCNC: 10 MG/DL (ref 8–22)
C REACTIVE PROTEIN LHE: 1.4 MG/DL (ref 0–?)
CALCIUM SERPL-MCNC: 10.7 MG/DL (ref 8.5–10.5)
CHLORIDE BLD-SCNC: 108 MMOL/L (ref 98–107)
CO2 SERPL-SCNC: 25 MMOL/L (ref 22–31)
CREAT SERPL-MCNC: 0.7 MG/DL (ref 0.6–1.1)
DIASTOLIC BLOOD PRESSURE - MUSE: NORMAL MMHG
EOSINOPHIL # BLD AUTO: 0.1 10E3/UL (ref 0–0.7)
EOSINOPHIL NFR BLD AUTO: 1 %
ERYTHROCYTE [DISTWIDTH] IN BLOOD BY AUTOMATED COUNT: 13.4 % (ref 10–15)
ERYTHROCYTE [SEDIMENTATION RATE] IN BLOOD BY WESTERGREN METHOD: 14 MM/HR (ref 0–20)
GFR SERPL CREATININE-BSD FRML MDRD: >90 ML/MIN/1.73M2
GLUCOSE BLD-MCNC: 92 MG/DL (ref 70–125)
HCT VFR BLD AUTO: 40 % (ref 35–47)
HGB BLD-MCNC: 13.2 G/DL (ref 11.7–15.7)
IMM GRANULOCYTES # BLD: 0 10E3/UL
IMM GRANULOCYTES NFR BLD: 0 %
INTERPRETATION ECG - MUSE: NORMAL
LYMPHOCYTES # BLD AUTO: 2.5 10E3/UL (ref 0.8–5.3)
LYMPHOCYTES NFR BLD AUTO: 23 %
MCH RBC QN AUTO: 28.6 PG (ref 26.5–33)
MCHC RBC AUTO-ENTMCNC: 33 G/DL (ref 31.5–36.5)
MCV RBC AUTO: 87 FL (ref 78–100)
MONOCYTES # BLD AUTO: 0.5 10E3/UL (ref 0–1.3)
MONOCYTES NFR BLD AUTO: 5 %
NEUTROPHILS # BLD AUTO: 7.7 10E3/UL (ref 1.6–8.3)
NEUTROPHILS NFR BLD AUTO: 71 %
NRBC # BLD AUTO: 0 10E3/UL
NRBC BLD AUTO-RTO: 0 /100
P AXIS - MUSE: 32 DEGREES
PLATELET # BLD AUTO: 286 10E3/UL (ref 150–450)
POTASSIUM BLD-SCNC: 4.4 MMOL/L (ref 3.5–5)
PR INTERVAL - MUSE: 144 MS
PROT SERPL-MCNC: 7.3 G/DL (ref 6–8)
QRS DURATION - MUSE: 72 MS
QT - MUSE: 362 MS
QTC - MUSE: 415 MS
R AXIS - MUSE: 0 DEGREES
RBC # BLD AUTO: 4.62 10E6/UL (ref 3.8–5.2)
SODIUM SERPL-SCNC: 138 MMOL/L (ref 136–145)
SYSTOLIC BLOOD PRESSURE - MUSE: NORMAL MMHG
T AXIS - MUSE: 12 DEGREES
TROPONIN I SERPL-MCNC: <0.01 NG/ML (ref 0–0.29)
VENTRICULAR RATE- MUSE: 79 BPM
WBC # BLD AUTO: 10.9 10E3/UL (ref 4–11)

## 2023-07-18 PROCEDURE — 71275 CT ANGIOGRAPHY CHEST: CPT

## 2023-07-18 PROCEDURE — 93970 EXTREMITY STUDY: CPT

## 2023-07-18 PROCEDURE — 36415 COLL VENOUS BLD VENIPUNCTURE: CPT | Performed by: STUDENT IN AN ORGANIZED HEALTH CARE EDUCATION/TRAINING PROGRAM

## 2023-07-18 PROCEDURE — 86140 C-REACTIVE PROTEIN: CPT | Performed by: STUDENT IN AN ORGANIZED HEALTH CARE EDUCATION/TRAINING PROGRAM

## 2023-07-18 PROCEDURE — 93005 ELECTROCARDIOGRAM TRACING: CPT | Performed by: STUDENT IN AN ORGANIZED HEALTH CARE EDUCATION/TRAINING PROGRAM

## 2023-07-18 PROCEDURE — 83880 ASSAY OF NATRIURETIC PEPTIDE: CPT | Performed by: STUDENT IN AN ORGANIZED HEALTH CARE EDUCATION/TRAINING PROGRAM

## 2023-07-18 PROCEDURE — 250N000011 HC RX IP 250 OP 636: Performed by: STUDENT IN AN ORGANIZED HEALTH CARE EDUCATION/TRAINING PROGRAM

## 2023-07-18 PROCEDURE — 99285 EMERGENCY DEPT VISIT HI MDM: CPT | Mod: 25

## 2023-07-18 PROCEDURE — 85652 RBC SED RATE AUTOMATED: CPT | Performed by: STUDENT IN AN ORGANIZED HEALTH CARE EDUCATION/TRAINING PROGRAM

## 2023-07-18 PROCEDURE — 99214 OFFICE O/P EST MOD 30 MIN: CPT | Performed by: PHYSICIAN ASSISTANT

## 2023-07-18 PROCEDURE — 80053 COMPREHEN METABOLIC PANEL: CPT | Performed by: STUDENT IN AN ORGANIZED HEALTH CARE EDUCATION/TRAINING PROGRAM

## 2023-07-18 PROCEDURE — 85025 COMPLETE CBC W/AUTO DIFF WBC: CPT | Performed by: STUDENT IN AN ORGANIZED HEALTH CARE EDUCATION/TRAINING PROGRAM

## 2023-07-18 PROCEDURE — 84484 ASSAY OF TROPONIN QUANT: CPT | Performed by: STUDENT IN AN ORGANIZED HEALTH CARE EDUCATION/TRAINING PROGRAM

## 2023-07-18 RX ORDER — IOPAMIDOL 755 MG/ML
100 INJECTION, SOLUTION INTRAVASCULAR ONCE
Status: COMPLETED | OUTPATIENT
Start: 2023-07-18 | End: 2023-07-18

## 2023-07-18 RX ADMIN — IOPAMIDOL 100 ML: 755 INJECTION, SOLUTION INTRAVENOUS at 18:38

## 2023-07-18 ASSESSMENT — ENCOUNTER SYMPTOMS
FEVER: 0
SHORTNESS OF BREATH: 1

## 2023-07-18 ASSESSMENT — ACTIVITIES OF DAILY LIVING (ADL)
ADLS_ACUITY_SCORE: 33
ADLS_ACUITY_SCORE: 35

## 2023-07-18 NOTE — ED PROVIDER NOTES
EMERGENCY DEPARTMENT ENCOUNTER      NAME: Sirisha Palmer  AGE: 47 year old female  YOB: 1975  MRN: [id number]  EVALUATION DATE & TIME: 2023  3:48 PM    PCP: Roseann Lazar    ED PROVIDER: Jim Siddiqui M.D.      Chief Complaint   Patient presents with     Leg Swelling     Rash         FINAL IMPRESSION:  1. Bilateral leg edema          ED COURSE & MEDICAL DECISION MAKIN:41 PM I introduced myself to the patient, obtained patient history, performed a physical exam, and discussed plan for ED workup including potential diagnostic laboratory/imaging studies and interventions.    7:36 PM Rechecked and updated the patient. We discussed the plan for discharge and the patient is agreeable. Reviewed supportive cares, symptomatic treatment, outpatient follow up, and reasons to return to the Emergency Department. Patient to be discharged by ED RN.         Pertinent Labs & Imaging studies reviewed. (See chart for details)  47 year old female presents to the Emergency Department for evaluation of bilateral lower extremity edema and rash.  Was seen by an patient in the clinic who was concerned for the possibility of DVT or even PE as there was some shortness of breath.  Bilateral ultrasounds were normal here in the ER.  Was concerned with some of the shortness of breath symptoms she was describing and her recent long car ride and so a CT scan was done to further rule out thromboembolic disease or PE.  This is normal.  No other lung abnormality such as pneumonia.  Was concern for possibility of CHF based on the edema and mild shortness of breath however her BNP, troponin and EKG are all unremarkable.  I think that makes this less likely.  Patient's rash appears almost vasculitic in nature and with the presence of the rash and the swelling I believe most likely patient has some sort of vasculitis.  HSP is a possibility.  Patient's kidney function is normal electrolytes are normal.  She has  had this sort of symptoms in the past and I believe probably needs a vasculitis work-up with a primary doctor or even rheumatologist.  With the lack of any sort of acute issue I was comfortable discharging the patient home having her use symptomatic care such as compression stockings and ibuprofen for the pain and have her follow-up with her primary doctor.    At the conclusion of the encounter I discussed the results of all of the tests and the disposition. The questions were answered. The patient or family acknowledged understanding and was agreeable with the care plan.              Medical Decision Making    History:    Supplemental history from: Documented in chart, if applicable    External Record(s) reviewed: Documented in chart, if applicable.    Work Up:    Chart documentation includes differential considered and any EKGs or imaging independently interpreted by provider, where specified.    In additional to work up documented, I considered the following work up: Documented in chart, if applicable.    External consultation:    Discussion of management with another provider: Documented in chart, if applicable    Complicating factors:    Care impacted by chronic illness: N/A    Care affected by social determinants of health: N/A    Disposition considerations: Discharge. No recommendations on prescription strength medication(s). I considered admission, but discharged the patient after share decision making conversation.        This patient involved a high degree of complexity in medical decision making, as significant risks were present and assessed. Recent encounters & results in medical record reviewed by me.     All workup (i.e. any EKG/labs/imaging as per charting below) reviewed and independently interpreted by me. See respective sections for details.       minutes of critical care time     MEDICATIONS GIVEN IN THE EMERGENCY:  Medications   iopamidol (ISOVUE-370) solution 100 mL (100 mLs Intravenous $Given  7/18/23 1838)       NEW PRESCRIPTIONS STARTED AT TODAY'S ER VISIT  Discharge Medication List as of 7/18/2023  7:34 PM             =================================================================    HPI    Patient information was obtained from: Patient and     Use of : DIGNA        Sirisha Palmer is a 47 year old female with no pertinent history on file who presents for evaluation of leg swelling and rash.     Patient has a recent trip to [place]. On 7[place]15 she was at a zoo on a hot day and says that she developed bilateral LE edema and rashes while walking and says that it progressively got worse. She also says that her legs were painful and she had some shortness of breath. On 7/16 she spent 12 hours in the car driving back from [place] to [place].Patient says that since returning to [place] her rashes have gotten smaller, but the swelling is the same. Patient still endorses shortness of breath but denies chest pain. She also notes that her legs are always numb and that's not a new concern. Patient denies fevers.     Patient notes that she sometimes gets rashes when the weather is hot.     REVIEW OF SYSTEMS   Review of Systems   Constitutional: Negative for fever.   Respiratory: Positive for shortness of breath.    Cardiovascular: Negative for chest pain.   Musculoskeletal:        Positive for bilateral LE swelling    Skin: Positive for rash (Bilateral LE).   All other systems reviewed and are negative.       PAST MEDICAL HISTORY:  Past Medical History:   Diagnosis Date     Anemia      H/O renal calculi      Kidney stones      Kidney stones      Menorrhagia        PAST SURGICAL HISTORY:  Past Surgical History:   Procedure Laterality Date     CHOLECYSTECTOMY      2011     CHOLECYSTECTOMY       LAPAROSCOPIC HYSTERECTOMY TOTAL N/A 6/29/2018    Procedure: ROBOTIC TOTAL LAPAROSCOPIC HYSTERECTOMY BILATERAL SALPINGECTOMY CYSTOSCOPY;  Surgeon: Sandy Gabriel MD;  Location: [facility] OR;  Service:       OTHER SURGICAL HISTORY      wisdom teeth extractions     ME ERCP W/BIOPSY SINGLE/MULTIPLE             CURRENT MEDICATIONS:    citalopram (CELEXA) 20 MG tablet  escitalopram (LEXAPRO) 10 MG tablet        ALLERGIES:  Allergies   Allergen Reactions     Codeine      Migraine       FAMILY HISTORY:  Family History   Problem Relation Age of Onset     Myocardial Infarction Father 42        x 7     Pre-Diabetes Father      Multiple Sclerosis Mother      Urolithiasis Mother      Heart Disease Father      Cerebrovascular Disease Father      Urolithiasis Sister      Cancer Maternal Aunt      Cancer Paternal Uncle      Diabetes Maternal Grandmother      Cancer Maternal Grandmother      Cancer Maternal Grandfather      Cancer Paternal Grandfather      Clotting Disorder No family hx of      Gout No family hx of        SOCIAL HISTORY:   Social History     Socioeconomic History     Marital status:    Tobacco Use     Smoking status: Never     Smokeless tobacco: Never   Substance and Sexual Activity     Alcohol use: Yes     Alcohol/week: 0.0 standard drinks of alcohol     Comment: Alcoholic Drinks/day: occasional glass of wine     Drug use: No     Sexual activity: Yes     Partners: Male     Birth control/protection: None       VITALS:  BP (!) 173/86   Pulse 115   Temp 98.1  F (36.7  C) (Oral)   Resp 19   Ht 1.524 m (5')   Wt 83.9 kg (185 lb)   SpO2 99%   BMI 36.13 kg/m        PHYSICAL EXAM    Constitutional: Well developed, Well nourished, NAD, GCS 15  HENT: Normocephalic, Atraumatic, Bilateral external ears normal, Oropharynx normal, mucous membranes moist, Nose normal. Neck-  Normal range of motion, No tenderness, Supple, No stridor.  Eyes: PERRL, EOMI, Conjunctiva normal, No discharge.   Respiratory: Normal breath sounds, No respiratory distress, No wheezing, Speaks full sentences easily. No cough.   Cardiovascular: Normal heart rate, Regular rhythm, No murmurs, No rubs, No gallops. Chest wall nontender.  GI:Soft,  No tenderness, No masses, No flank tenderness. No rebound or guarding.   Musculoskeletal: 2+ DP pulses..No cyanosis, No clubbing. Good range of motion in all major joints. No tenderness to palpation or major deformities noted.   Integument: Bilateral LE swelling. Bilateral LE pityriasis patchy rash below the knees.   Neurologic: Alert & oriented x 3,  CN 3-12 intact Normal motor function, Normal sensory function, No focal deficits noted. Normal gait. Normal finger to nose bilaterally  Psychiatric: Affect normal, Judgment normal, Mood normal. Cooperative.          LAB:  All pertinent labs reviewed and interpreted.  Labs Ordered and Resulted from Time of ED Arrival to Time of ED Departure   COMPREHENSIVE METABOLIC PANEL - Abnormal       Result Value    Sodium 138      Potassium 4.4      Chloride 108 (*)     Carbon Dioxide (CO2) 25      Anion Gap 5      Urea Nitrogen 10      Creatinine 0.70      Calcium 10.7 (*)     Glucose 92      Alkaline Phosphatase 93      AST 11      ALT <9      Protein Total 7.3      Albumin 3.9      Bilirubin Total 0.4      GFR Estimate >90     CRP INFLAMMATION - Abnormal    CRP 1.4 (*)    ERYTHROCYTE SEDIMENTATION RATE AUTO - Normal    Erythrocyte Sedimentation Rate 14     B-TYPE NATRIURETIC PEPTIDE ([facility] ONLY) - Normal    BNP 16     TROPONIN I - Normal    Troponin I <0.01     CBC WITH PLATELETS AND DIFFERENTIAL    WBC Count 10.9      RBC Count 4.62      Hemoglobin 13.2      Hematocrit 40.0      MCV 87      MCH 28.6      MCHC 33.0      RDW 13.4      Platelet Count 286      % Neutrophils 71      % Lymphocytes 23      % Monocytes 5      % Eosinophils 1      % Basophils 0      % Immature Granulocytes 0      NRBCs per 100 WBC 0      Absolute Neutrophils 7.7      Absolute Lymphocytes 2.5      Absolute Monocytes 0.5      Absolute Eosinophils 0.1      Absolute Basophils 0.0      Absolute Immature Granulocytes 0.0      Absolute NRBCs 0.0         RADIOLOGY:  Reviewed all pertinent imaging. Please see  official radiology report.  CT Chest Pulmonary Embolism w Contrast   Final Result   IMPRESSION:   1.  Negative CTA chest.      US Lower Extremity Venous Duplex Bilateral   Final Result   IMPRESSION:   1.  No deep venous thrombosis in the bilateral lower extremities.          EKG:    Performed at: 18-JUL-2023 17:15    Impression: Sinus rhythm    Rate: 79  Rhythm: Sinus   Axis: 0  NE Interval: 144  QRS Interval: 72  QTc Interval: 415  ST Changes: None  Comparison: No previous     I have independently reviewed and interpreted the EKG(s) documented above.    PROCEDURES:         I, William Kellogg, am serving as a scribe to document services personally performed by Dr. Jim Siddiqui based on my observation and the provider's statements to me. I, Jim Siddiqui MD attest that William Kellogg is acting in a scribe capacity, has observed my performance of the services and has documented them in accordance with my direction.    Jim Siddiqui M.D.  Emergency Medicine  [facility] EMERGENCY ROOM  [address] [id number] [phone]  Dept: [phone]       Jim Siddiqui MD  07/22/23 0858

## 2023-07-18 NOTE — PROGRESS NOTES
Patient presents with:  Rash: Rash painful and swelling on both legs since saturday      Clinical Decision Making:  Patient is sent to next higher level of care at the emergency room for evaluation of possible bilateral PEs however there is a high preprobability suspicion of DVT in the right lower extremity.  Patient also has had multiple risk factors with travel since June through August.  Patient just had a 12-hour car trip from [place] to [place] on Sunday, 2 days ago.  Patient has difficulty with weightbearing on the right lower extremity and pain.  Patient is sent to the emergency room for evaluation and treatment and questions were answered to patient's satisfaction before discharge.      ICD-10-CM    1. Localized swelling of both lower extremities  M79.89       2. Homans sign present  R09.89       3. Shortness of breath  R06.02           Patient Instructions   Present to the emergency room for evaluation and treatment        HPI:  Sirisha Palmer is a 47 year old female who presents today for bilateral lower extremity swelling and pain.    Patient had a trip last week of June and flew twice.  Distally patient had a very long car trip from [place] to [place] down to [facility].  Patient then drove back from [place] to [place] on Friday and on Sunday she spent 12 hours in the car driving back from [place] to [place].  Patient has had difficulty with swelling in the right greater than the left lower extremities.  Shortness of breath and dyspnea on exertion but no pleuritic chest pain chest pain syncope presyncope or heart palpitations to report.  Patient does not have a prior history of DVT or PE, she did not have history of smoking and is status post hysterectomy and denies pregnancy hormone therapy.  However the patient was in the heat in [place] and may have been slightly dehydrated.    History obtained from chart review and the patient.    Problem List:  2016-06:  Adjustment disorder with mixed anxiety and depressed mood  H/O renal calculi      Past Medical History:   Diagnosis Date     Anemia      H/O renal calculi      Kidney stones      Kidney stones      Menorrhagia        Social History     Tobacco Use     Smoking status: Never     Smokeless tobacco: Never   Substance Use Topics     Alcohol use: Yes     Alcohol/week: 0.0 standard drinks of alcohol     Comment: Alcoholic Drinks/day: occasional glass of wine       Review of Systems  As above in HPI otherwise negative.    Vitals:    07/18/23 1306   BP: (!) 149/89   Pulse: 85   Resp: 18   Temp: 97.9  F (36.6  C)   TempSrc: Oral   SpO2: 99%       General: Patient is resting comfortably no acute distress is afebrile  HEENT: Head is normocephalic atraumatic   eyes are PERRL EOMI sclera anicteric   TMs are clear bilaterally  No cervical lymphadenopathy present  LUNGS: Clear to auscultation bilaterally  HEART: Regular rate and rhythm  Skin: Without rash non-diaphoretic  Musculoskeletal:  The right lower extremity is markedly edematous.  There is a positive Homans' sign on the right calf.  There is pain along the anterior medial aspect of the thigh along the femoral artery and vein.  3+ pitting edema on the right pretibial area.  There is also ecchymoses over the medial and lateral calf  Left lower extremity has 2+ pitting edema there is less swelling than the right negative Homans' sign on the left side is still painful over the anteromedial aspect of the left thigh.  Note was made of ecchymoses on the left medial and lateral calf    Physical Exam    At the end of the encounter, I discussed results, diagnosis, medications. Discussed red flags for immediate return to clinic/ER, as well as indications for follow up if no improvement. Patient understood and agreed to plan. Patient was stable for discharge.

## 2023-07-18 NOTE — ED TRIAGE NOTES
Sent by clinic for evaluation of bilateral lower leg swelling and rash since traveling.     Triage Assessment     Row Name 07/18/23 9116       Triage Assessment (Adult)    Airway WDL WDL       Respiratory WDL    Respiratory WDL WDL       Skin Circulation/Temperature WDL    Skin Circulation/Temperature WDL WDL       Cardiac WDL    Cardiac WDL WDL       Peripheral/Neurovascular WDL    Peripheral Neurovascular WDL WDL       Cognitive/Neuro/Behavioral WDL    Cognitive/Neuro/Behavioral WDL WDL

## 2023-07-19 NOTE — DISCHARGE INSTRUCTIONS
Please use ibuprofen for any sort of pain.  Compression stockings may help with the swelling.    You will need to follow up with your primary physician for further tests to try and differentiate whether this is for sure a vasculitis and if so, which type.

## 2024-01-27 ENCOUNTER — HEALTH MAINTENANCE LETTER (OUTPATIENT)
Age: 49
End: 2024-01-27

## 2025-02-01 ENCOUNTER — HEALTH MAINTENANCE LETTER (OUTPATIENT)
Age: 50
End: 2025-02-01